# Patient Record
(demographics unavailable — no encounter records)

---

## 2025-03-11 NOTE — HISTORY OF PRESENT ILLNESS
[FreeTextEntry1] : 61 yo male with pmhx as below is here for a f/up visit s/p ccta, followed by cath/ifr guided pci of lad no major complains tolerating dapt et stable ros is otherwise as below

## 2025-03-11 NOTE — ASSESSMENT
[FreeTextEntry1] : 59 yo male with pmhx and presentation as above cad/pci of lad all labs reviewed cont arb, dapt, statin cont with mod level exercise diet discussed stress echo end of the year stop plavix 7/25 rtc 6 months

## 2025-03-19 NOTE — HISTORY OF PRESENT ILLNESS
[de-identified] : This is a pleasant 60-year-old male who sustained a fall injury yesterday where he crashed into a metal cart after tripping at home making steaks.  He sustained a laceration to the left posterior forearm approximately 5 cm in length and had associated swelling and bleeding.  This was treated in the ER with suture closure and washout and the arm was dressed.  He was instructed follow-up in my office today for evaluation.  Of note he has a previous history of 80% LAD stenosis with stent placement for asymptomatic coronary artery disease in July.  He has been on aspirin and Plavix since that time and was told he will be on Plavix for about a year.    Currently he feels well and has no symptoms of numbness or tingling.  The swelling has markedly decreased.  He has no pain.  He has been taking Keflex as directed by the ER for a 5-day period.  He has been using ice on the forearm and the swelling has decreased markedly.  He showed me a picture with marked swelling which is much smaller in contrast to the pictures from the ER visit.

## 2025-03-19 NOTE — PLAN
[FreeTextEntry1] : I instructed the patient with wound care to maintain the wound and stitches dry until another 48 hours.  There is a slight ooze.  There is no obvious hematoma.  He should rewrap with gauze Kerlix and Ace daily and continue Keflex as instructed.  I recommended that he hold the Plavix for 5 days total and continue baby aspirin.  He has been on Plavix for 6 months already and 5 days should be reasonable at this time.  He will follow back up in 2 weeks for suture removal.

## 2025-03-19 NOTE — CONSULT LETTER
[Dear  ___] : Dear  [unfilled], [Courtesy Letter:] : I had the pleasure of seeing your patient, [unfilled], in my office today. [Please see my note below.] : Please see my note below. [Consult Closing:] : Thank you very much for allowing me to participate in the care of this patient.  If you have any questions, please do not hesitate to contact me. [Sincerely,] : Sincerely, [FreeTextEntry3] : Dawit Carr MD, FACS [DrStanton  ___] : Dr. MALONE

## 2025-03-19 NOTE — PHYSICAL EXAM
[JVD] : no jugular venous distention  [Normal Breath Sounds] : Normal breath sounds [Normal Rate and Rhythm] : normal rate and rhythm [No Rash or Lesion] : No rash or lesion [Alert] : alert [Oriented to Person] : oriented to person [Oriented to Place] : oriented to place [Calm] : calm [de-identified] : NAD [de-identified] : NCAT [de-identified] : Soft nontender [de-identified] : Dressed left forearm wound 8 cm in size.  Mild swelling.  There is mild swelling of the left hand up to the elbow.  Capillary refill is normal.  No erythema.

## 2025-04-25 NOTE — CONSULT LETTER
[Dear  ___] : Dear  [unfilled], [Courtesy Letter:] : I had the pleasure of seeing your patient, [unfilled], in my office today. [Please see my note below.] : Please see my note below. [Consult Closing:] : Thank you very much for allowing me to participate in the care of this patient.  If you have any questions, please do not hesitate to contact me. [Sincerely,] : Sincerely, [DrStanton  ___] : Dr. MALONE [FreeTextEntry3] : Dawit Carr MD, FACS

## 2025-04-25 NOTE — HISTORY OF PRESENT ILLNESS
[de-identified] : This is a pleasant 60-year-old male who sustained a fall injury yesterday where he crashed into a metal cart after tripping at home making steaks.  He sustained a laceration to the left posterior forearm approximately 5 cm in length and had associated swelling and bleeding.  This was treated in the ER with suture closure and washout and the arm was dressed.  He was instructed follow-up in my office today for evaluation.  Of note he has a previous history of 80% LAD stenosis with stent placement for asymptomatic coronary artery disease in July.  He has been on aspirin and Plavix since that time and was told he will be on Plavix for about a year.    Currently he feels well and has no symptoms of numbness or tingling.  The swelling has markedly decreased.  He has no pain.  He has been taking Keflex as directed by the ER for a 5-day period.  He has been using ice on the forearm and the swelling has decreased markedly.  He showed me a picture with marked swelling which is much smaller in contrast to the pictures from the ER visit. [de-identified] : Patient presents today for wound check of the left forearm laceration repair by the ER.  He took the sutures out at home several days ago.  He is doing well and has no complaints.  His questions today centered around primarily healing of the wound and the scarring and swelling.  There is minimal swelling at this time and the wound looks almost completely healed.  He notes no  strength changes or activity level disruption.  All his questions were answered today.

## 2025-04-25 NOTE — REASON FOR VISIT
[Follow-Up: _____] : a [unfilled] follow-up visit [Consultation] : a consultation visit [FreeTextEntry1] : Forearm lac with hematoma and swelling

## 2025-04-25 NOTE — PHYSICAL EXAM
[de-identified] : Wound of the left forearm is completely healed there is some mild swelling noted.   strength and activity of the muscles noted.

## 2025-04-25 NOTE — PLAN
[FreeTextEntry1] : We had an extensive discussion about the processes of wound healing.  All his questions were answered.  He can resume normal activity without restriction.  As needed  The submitted E/M billing level for this visit reflects the total time spent on the day of the visit including face-to-face time spent with the patient, non-face-to-face review of medical records and relevant information, documentation, and asynchronous communication with the patient after a visit via phone, email, or patients EHR portal after the visit. The medical records reviewed are either scanned into the chart or reviewed with the patient using a patient's electronic medical record portal for patients with records not available to [facility] via electronic transmission platforms from other institutions and labs. Time spent counseling and performing coordination of care was also included in determining the appropriate EM billing level.  I have reviewed and verified information regarding the chief complaint and history recorded by the ancillary staff and/or the patient. I have independently reviewed and interpreted tests performed by other physicians and facilities as necessary.  I have discussed with the patient differential diagnosis, reason for auxiliary tests if ordered, risks, benefits, alternatives, and complications of each form of therapy were discussed.